# Patient Record
Sex: MALE | Race: WHITE | NOT HISPANIC OR LATINO | URBAN - METROPOLITAN AREA
[De-identification: names, ages, dates, MRNs, and addresses within clinical notes are randomized per-mention and may not be internally consistent; named-entity substitution may affect disease eponyms.]

---

## 2020-01-10 NOTE — PATIENT DISCUSSION
1/13/20 Pt called electing gtts go to [facility] 3 gtts Sent to [employer] [facility].

## 2020-01-10 NOTE — PATIENT DISCUSSION
Patient advised that he will possibly need glasses at all focal points after surgery. Patient elects [facility], goal of [employer].

## 2020-02-21 NOTE — PATIENT DISCUSSION
1/13/20 Pt called electing gtts go to [facility] 3 gtts Sent to [employer].

## 2020-02-21 NOTE — PATIENT DISCUSSION
1/13/20 Pt called electing gtts go to [facility] 3 gtts Sent to [employer].

## 2020-03-23 NOTE — PATIENT DISCUSSION
1/13/20 Pt called electing gtts go to [facility] 3 gtts Sent to [employer].

## 2020-12-29 ENCOUNTER — IMPORTED ENCOUNTER (OUTPATIENT)
Dept: URBAN - METROPOLITAN AREA CLINIC 9 | Facility: CLINIC | Age: 75
End: 2020-12-29

## 2021-01-18 ENCOUNTER — RX ONLY (RX ONLY)
Age: 76
End: 2021-01-18

## 2021-01-18 ENCOUNTER — MOHS SURGERY-ROUTINE (OUTPATIENT)
Dept: URBAN - METROPOLITAN AREA CLINIC 12 | Facility: CLINIC | Age: 76
Setting detail: DERMATOLOGY
End: 2021-01-18

## 2021-01-18 DIAGNOSIS — L57.0 ACTINIC KERATOSIS: ICD-10-CM

## 2021-01-18 PROCEDURE — 17311 MOHS 1 STAGE H/N/HF/G: CPT

## 2021-01-18 PROCEDURE — 17312 MOHS ADDL STAGE: CPT

## 2021-01-18 PROCEDURE — 14061 TIS TRNFR E/N/E/L10.1-30SQCM: CPT

## 2021-01-18 RX ORDER — DOXYCYCLINE HYCLATE 100 MG/1
1 CAPSULE CAPSULE, GELATIN COATED ORAL TWICE A DAY
Qty: 14 | Refills: 0
Start: 2021-01-18

## 2021-01-25 ENCOUNTER — SUTURE REMOVAL (OUTPATIENT)
Dept: URBAN - METROPOLITAN AREA CLINIC 12 | Facility: CLINIC | Age: 76
Setting detail: DERMATOLOGY
End: 2021-01-25

## 2021-01-25 DIAGNOSIS — D04.39 CARCINOMA IN SITU OF SKIN OF OTHER PARTS OF FACE: ICD-10-CM

## 2021-01-25 PROCEDURE — 99024 POSTOP FOLLOW-UP VISIT: CPT

## 2021-02-19 ENCOUNTER — IMPORTED ENCOUNTER (OUTPATIENT)
Dept: URBAN - METROPOLITAN AREA CLINIC 9 | Facility: CLINIC | Age: 76
End: 2021-02-19

## 2021-02-28 NOTE — PATIENT DISCUSSION
1/13/20 Pt called electing gtts go to [facility] 3 gtts Sent to [email or web address]. no

## 2021-03-30 ENCOUNTER — IMPORTED ENCOUNTER (OUTPATIENT)
Dept: URBAN - METROPOLITAN AREA CLINIC 9 | Facility: CLINIC | Age: 76
End: 2021-03-30

## 2021-04-29 ENCOUNTER — IMPORTED ENCOUNTER (OUTPATIENT)
Dept: URBAN - METROPOLITAN AREA CLINIC 9 | Facility: CLINIC | Age: 76
End: 2021-04-29

## 2021-05-05 ENCOUNTER — IMPORTED ENCOUNTER (OUTPATIENT)
Dept: URBAN - METROPOLITAN AREA CLINIC 9 | Facility: CLINIC | Age: 76
End: 2021-05-05

## 2021-05-13 ENCOUNTER — IMPORTED ENCOUNTER (OUTPATIENT)
Dept: URBAN - METROPOLITAN AREA CLINIC 9 | Facility: CLINIC | Age: 76
End: 2021-05-13

## 2021-05-24 ENCOUNTER — IMPORTED ENCOUNTER (OUTPATIENT)
Dept: URBAN - METROPOLITAN AREA CLINIC 9 | Facility: CLINIC | Age: 76
End: 2021-05-24

## 2021-05-28 ENCOUNTER — IMPORTED ENCOUNTER (OUTPATIENT)
Dept: URBAN - METROPOLITAN AREA CLINIC 9 | Facility: CLINIC | Age: 76
End: 2021-05-28

## 2021-06-01 ENCOUNTER — OTHER- (OUTPATIENT)
Dept: URBAN - METROPOLITAN AREA CLINIC 12 | Facility: CLINIC | Age: 76
Setting detail: DERMATOLOGY
End: 2021-06-01

## 2021-06-01 DIAGNOSIS — L57.0 ACTINIC KERATOSIS: ICD-10-CM

## 2021-06-01 PROCEDURE — 99213 OFFICE O/P EST LOW 20 MIN: CPT

## 2021-06-07 ENCOUNTER — IMPORTED ENCOUNTER (OUTPATIENT)
Dept: URBAN - METROPOLITAN AREA CLINIC 9 | Facility: CLINIC | Age: 76
End: 2021-06-07

## 2021-06-09 ENCOUNTER — IMPORTED ENCOUNTER (OUTPATIENT)
Dept: URBAN - METROPOLITAN AREA CLINIC 9 | Facility: CLINIC | Age: 76
End: 2021-06-09

## 2021-08-17 ENCOUNTER — IMPORTED ENCOUNTER (OUTPATIENT)
Dept: URBAN - METROPOLITAN AREA CLINIC 9 | Facility: CLINIC | Age: 76
End: 2021-08-17

## 2021-10-18 ASSESSMENT — VISUAL ACUITY
OS_SC: 20/30 -2 SN
OD_SC: 20/100 SN
OS_SC: 20/40 SN
OD_SC: 20/400 + SN
OS_SC: 20/40 SN
OD_SC: 20/100 SN
OD_CC: 20/100 SN
OS_CC: 20/25 SN
OD_SC: 20/200 SN
OD_SC: 20/200 SN
OS_SC: 20/40 SN
OD_SC: 20/200 SN
OD_CC: 20/80 SN
OD_SC: 20/200 SN
OD_PH: 20/200 SN
OD_CC: 20/100 SN
OD_CC: 20/200 SN
OD_SC: 20/200 SN
OD_CC: 20/80 SN
OD_SC: 20/100 - SN
OD_CC: 20/80 - SN
OD_SC: 20/100 SN
OD_SC: 20/100 - SN

## 2021-10-18 ASSESSMENT — TONOMETRY
OS_IOP_MMHG: 8
OD_IOP_MMHG: 11
OS_IOP_MMHG: 7
OD_IOP_MMHG: 10
OD_IOP_MMHG: 14
OS_IOP_MMHG: 7
OD_IOP_MMHG: 15
OS_IOP_MMHG: 14
OD_IOP_MMHG: 9

## 2021-10-18 ASSESSMENT — KERATOMETRY
OS_AXISANGLE2_DEGREES: 159
OS_AXISANGLE_DEGREES: 69
OS_K1POWER_DIOPTERS: 43.5
OD_AXISANGLE_DEGREES: 111
OD_K1POWER_DIOPTERS: 43
OS_K2POWER_DIOPTERS: 45.25
OD_K2POWER_DIOPTERS: 44.5
OD_AXISANGLE2_DEGREES: 21

## 2022-02-01 ENCOUNTER — MOHS SURGERY-ROUTINE (OUTPATIENT)
Dept: URBAN - METROPOLITAN AREA CLINIC 12 | Facility: CLINIC | Age: 77
Setting detail: DERMATOLOGY
End: 2022-02-01

## 2022-02-01 DIAGNOSIS — L81.1 CHLOASMA: ICD-10-CM

## 2022-02-01 DIAGNOSIS — L73.1 PSEUDOFOLLICULITIS BARBAE: ICD-10-CM

## 2022-02-01 DIAGNOSIS — L68.0 HIRSUTISM: ICD-10-CM

## 2022-02-01 PROCEDURE — 17313 MOHS 1 STAGE T/A/L: CPT

## 2022-02-01 PROCEDURE — 17314 MOHS ADDL STAGE T/A/L: CPT

## 2022-02-09 ENCOUNTER — RX ONLY (RX ONLY)
Age: 77
End: 2022-02-09

## 2022-02-09 ENCOUNTER — OTHER- (OUTPATIENT)
Dept: URBAN - METROPOLITAN AREA CLINIC 12 | Facility: CLINIC | Age: 77
Setting detail: DERMATOLOGY
End: 2022-02-09

## 2022-02-09 DIAGNOSIS — L24.9 IRRITANT CONTACT DERMATITIS, UNSPECIFIED CAUSE: ICD-10-CM

## 2022-02-09 DIAGNOSIS — L90.5 SCAR CONDITIONS AND FIBROSIS OF SKIN: ICD-10-CM

## 2022-02-09 DIAGNOSIS — D22.5 MELANOCYTIC NEVI OF TRUNK: ICD-10-CM

## 2022-02-09 DIAGNOSIS — B35.1 TINEA UNGUIUM: ICD-10-CM

## 2022-02-09 DIAGNOSIS — D18.01 HEMANGIOMA OF SKIN AND SUBCUTANEOUS TISSUE: ICD-10-CM

## 2022-02-09 DIAGNOSIS — L82.1 OTHER SEBORRHEIC KERATOSIS: ICD-10-CM

## 2022-02-09 DIAGNOSIS — Z85.828 PERSONAL HISTORY OF OTHER MALIGNANT NEOPLASM OF SKIN: ICD-10-CM

## 2022-02-09 PROBLEM — Z85828 V10.83: Status: ACTIVE | Noted: 2022-02-09

## 2022-02-09 PROBLEM — L089 686.9: Status: ACTIVE | Noted: 2022-02-09

## 2022-02-09 PROCEDURE — 99024 POSTOP FOLLOW-UP VISIT: CPT

## 2022-02-09 RX ORDER — CLINDAMYCIN HYDROCHLORIDE 300 MG/1
1 CAPSULE CAPSULE ORAL THREE TIMES A DAY
Qty: 21 | Refills: 0
Start: 2022-02-09

## 2022-02-09 RX ORDER — MUPIROCIN 20 MG/G
1 AS DIRECTED OINTMENT TOPICAL THREE TIMES A DAY
Qty: 22 | Refills: 2
Start: 2022-02-09

## 2022-06-18 RX ORDER — ESCITALOPRAM OXALATE 10 MG/1
TABLET ORAL
COMMUNITY
End: 2022-08-08 | Stop reason: SDUPTHER

## 2022-06-18 RX ORDER — ALBUTEROL SULFATE 90 UG/1
AEROSOL, METERED RESPIRATORY (INHALATION)
COMMUNITY
Start: 2022-05-02

## 2022-08-08 PROBLEM — M77.12 LEFT LATERAL EPICONDYLITIS: Status: ACTIVE | Noted: 2022-08-08

## 2022-08-08 PROBLEM — M25.521 RIGHT ELBOW PAIN: Status: ACTIVE | Noted: 2022-08-08

## 2022-08-08 PROBLEM — R91.1 PULMONARY NODULE: Status: ACTIVE | Noted: 2022-08-08

## 2022-08-08 PROBLEM — R35.0 URINARY FREQUENCY: Status: ACTIVE | Noted: 2022-08-08

## 2022-08-08 PROBLEM — K22.4 ESOPHAGEAL DYSMOTILITY: Status: ACTIVE | Noted: 2022-08-08

## 2022-08-08 PROBLEM — I27.20 PULMONARY HYPERTENSION (HCC): Status: ACTIVE | Noted: 2022-08-08

## 2022-08-08 PROBLEM — E78.5 DYSLIPIDEMIA: Status: ACTIVE | Noted: 2022-08-08

## 2022-08-08 PROBLEM — R12 HEARTBURN: Status: ACTIVE | Noted: 2022-08-08

## 2022-08-08 PROBLEM — F41.9 ANXIETY: Status: ACTIVE | Noted: 2022-08-08

## 2022-08-08 PROBLEM — R35.1 NOCTURIA: Status: ACTIVE | Noted: 2022-08-08

## 2022-08-08 PROBLEM — I48.91 ATRIAL FIBRILLATION (HCC): Status: ACTIVE | Noted: 2022-08-08

## 2022-08-08 PROBLEM — M65.342 TRIGGER FINGER, LEFT RING FINGER: Status: ACTIVE | Noted: 2022-08-08

## 2022-08-08 PROBLEM — E78.5 HYPERLIPIDEMIA: Status: ACTIVE | Noted: 2022-08-08

## 2022-08-08 PROBLEM — M62.89 HAMSTRING TIGHTNESS OF RIGHT LOWER EXTREMITY: Status: ACTIVE | Noted: 2022-08-08

## 2022-08-08 PROBLEM — K29.70 GASTRITIS: Status: ACTIVE | Noted: 2022-08-08

## 2022-08-08 PROBLEM — M25.561 PAIN IN RIGHT KNEE: Status: ACTIVE | Noted: 2022-08-08

## 2022-08-08 PROBLEM — I10 ESSENTIAL HYPERTENSION: Status: ACTIVE | Noted: 2022-08-08

## 2022-08-08 PROBLEM — S80.01XA CONTUSION OF RIGHT KNEE: Status: ACTIVE | Noted: 2022-08-08

## 2022-08-08 PROBLEM — M54.2 CERVICALGIA: Status: ACTIVE | Noted: 2022-08-08

## 2022-08-08 PROBLEM — N39.0 URINARY TRACT INFECTIOUS DISEASE: Status: ACTIVE | Noted: 2022-08-08

## 2022-08-08 PROBLEM — E46 PROTEIN-CALORIE MALNUTRITION (HCC): Status: ACTIVE | Noted: 2022-08-08

## 2022-08-08 PROBLEM — E66.9 OBESITY: Status: ACTIVE | Noted: 2022-08-08

## 2022-08-08 PROBLEM — G47.00 INSOMNIA: Status: ACTIVE | Noted: 2022-08-08

## 2022-08-08 PROBLEM — M54.50 MIDLINE LOW BACK PAIN WITHOUT SCIATICA: Status: ACTIVE | Noted: 2022-08-08

## 2022-08-08 PROBLEM — S76.101D: Status: ACTIVE | Noted: 2022-08-08

## 2022-08-08 PROBLEM — S76.111A STRAIN OF RIGHT QUADRICEPS: Status: ACTIVE | Noted: 2022-08-08

## 2022-08-08 PROBLEM — K40.90 LEFT INGUINAL HERNIA: Status: ACTIVE | Noted: 2022-08-08

## 2022-08-08 PROBLEM — R63.0 ANOREXIA: Status: ACTIVE | Noted: 2022-08-08

## 2022-08-08 PROBLEM — K91.2 POSTOPERATIVE MALABSORPTION: Status: ACTIVE | Noted: 2022-08-08

## 2022-08-08 PROBLEM — M25.361 OTHER INSTABILITY, RIGHT KNEE: Status: ACTIVE | Noted: 2022-08-08

## 2022-08-08 PROBLEM — N52.8 OTHER MALE ERECTILE DYSFUNCTION: Status: ACTIVE | Noted: 2022-08-08

## 2022-08-08 PROBLEM — M75.22 LEFT BICIPITAL TENOSYNOVITIS: Status: ACTIVE | Noted: 2022-08-08

## 2022-08-08 PROBLEM — R33.9 INCOMPLETE BLADDER EMPTYING: Status: ACTIVE | Noted: 2022-08-08

## 2022-08-08 PROBLEM — F43.20 ADJUSTMENT DISORDER: Status: ACTIVE | Noted: 2022-08-08

## 2022-08-08 PROBLEM — N13.8 BENIGN PROSTATIC HYPERPLASIA WITH URINARY OBSTRUCTION: Status: ACTIVE | Noted: 2022-08-08

## 2022-08-08 PROBLEM — N28.1 RENAL CYST: Status: ACTIVE | Noted: 2022-08-08

## 2022-08-08 PROBLEM — M24.561 FLEXION CONTRACTURE OF RIGHT KNEE: Status: ACTIVE | Noted: 2022-08-08

## 2022-08-08 PROBLEM — R39.15 URGENCY OF URINATION: Status: ACTIVE | Noted: 2022-08-08

## 2022-08-08 PROBLEM — M17.11 PATELLOFEMORAL ARTHRITIS OF RIGHT KNEE: Status: ACTIVE | Noted: 2022-08-08

## 2022-08-08 PROBLEM — M46.1 SACROILIITIS, NOT ELSEWHERE CLASSIFIED (HCC): Status: ACTIVE | Noted: 2022-08-08

## 2022-08-08 PROBLEM — E66.01 MORBID (SEVERE) OBESITY DUE TO EXCESS CALORIES (HCC): Status: ACTIVE | Noted: 2022-08-08

## 2022-08-08 PROBLEM — N40.1 BENIGN PROSTATIC HYPERPLASIA WITH URINARY OBSTRUCTION: Status: ACTIVE | Noted: 2022-08-08

## 2022-08-10 PROBLEM — M25.519 SHOULDER PAIN: Status: ACTIVE | Noted: 2022-08-10

## 2022-10-14 PROBLEM — Z87.440 HISTORY OF UTI: Status: ACTIVE | Noted: 2022-10-14

## 2022-10-14 PROBLEM — N52.9 ERECTILE DYSFUNCTION OF ORGANIC ORIGIN: Status: ACTIVE | Noted: 2022-08-08

## 2022-10-31 PROBLEM — N32.81 OAB (OVERACTIVE BLADDER): Status: ACTIVE | Noted: 2022-10-31

## 2022-10-31 PROBLEM — N39.41 URGE URINARY INCONTINENCE: Status: ACTIVE | Noted: 2022-10-31

## 2023-10-04 ENCOUNTER — ESTABLISHED PATIENT (OUTPATIENT)
Dept: URBAN - METROPOLITAN AREA CLINIC 14 | Facility: CLINIC | Age: 78
End: 2023-10-04

## 2023-10-04 DIAGNOSIS — H53.021: ICD-10-CM

## 2023-10-04 DIAGNOSIS — H43.813: ICD-10-CM

## 2023-10-04 PROCEDURE — 92015 DETERMINE REFRACTIVE STATE: CPT

## 2023-10-04 PROCEDURE — 99214 OFFICE O/P EST MOD 30 MIN: CPT

## 2023-10-04 ASSESSMENT — VISUAL ACUITY
OS_SC: 20/40
OD_SC: 20/200
OS_BCVA: 20/40
OD_BCVA: 20/200

## 2023-10-04 ASSESSMENT — TONOMETRY
OS_IOP_MMHG: 9
OD_IOP_MMHG: 10